# Patient Record
Sex: MALE | NOT HISPANIC OR LATINO | Employment: UNEMPLOYED
[De-identification: names, ages, dates, MRNs, and addresses within clinical notes are randomized per-mention and may not be internally consistent; named-entity substitution may affect disease eponyms.]

---
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
## 2022-08-03 PROBLEM — J30.1 ALLERGIC RHINITIS DUE TO POLLEN: Status: ACTIVE | Noted: 2022-08-03

## 2022-08-03 PROBLEM — G93.32 CHRONIC FATIGUE SYNDROME: Status: ACTIVE | Noted: 2019-12-13

## 2022-08-03 PROBLEM — J30.81 ALLERGIC RHINITIS DUE TO ANIMALS: Status: ACTIVE | Noted: 2022-08-03

## 2022-08-03 PROBLEM — J18.9 LEFT LOWER LOBE PNEUMONIA: Status: ACTIVE | Noted: 2019-12-18

## 2022-08-03 PROBLEM — G83.9 PARESIS (H): Status: ACTIVE | Noted: 2019-12-13

## 2022-08-04 ENCOUNTER — OFFICE VISIT (OUTPATIENT)
Dept: FAMILY MEDICINE | Facility: CLINIC | Age: 22
End: 2022-08-04
Payer: COMMERCIAL

## 2022-08-04 VITALS
HEART RATE: 47 BPM | BODY MASS INDEX: 20.51 KG/M2 | WEIGHT: 165 LBS | HEIGHT: 75 IN | SYSTOLIC BLOOD PRESSURE: 116 MMHG | DIASTOLIC BLOOD PRESSURE: 62 MMHG | OXYGEN SATURATION: 100 %

## 2022-08-04 DIAGNOSIS — Z11.1 VISIT FOR MANTOUX TEST: Primary | ICD-10-CM

## 2022-08-04 DIAGNOSIS — Z23 ENCOUNTER FOR IMMUNIZATION: ICD-10-CM

## 2022-08-04 LAB — HOLD SPECIMEN: NORMAL

## 2022-08-04 PROCEDURE — 36415 COLL VENOUS BLD VENIPUNCTURE: CPT | Performed by: FAMILY MEDICINE

## 2022-08-04 PROCEDURE — 99212 OFFICE O/P EST SF 10 MIN: CPT | Performed by: FAMILY MEDICINE

## 2022-08-04 PROCEDURE — 86481 TB AG RESPONSE T-CELL SUSP: CPT | Performed by: FAMILY MEDICINE

## 2022-08-04 RX ORDER — MULTIVITAMIN,THERAPEUTIC
1 TABLET ORAL DAILY
COMMUNITY

## 2022-08-04 ASSESSMENT — PAIN SCALES - GENERAL: PAINLEVEL: NO PAIN (0)

## 2022-08-04 NOTE — PROGRESS NOTES
"  Assessment & Plan     Visit for Mantoux test  Following tests will be ordered in place of the skin test  - Quantiferon TB Gold Plus    Encounter for immunization  Other immunizations are up-to-date patient is scheduled to enter [facility] in [facility]                   No follow-ups on file.    Kee Wick MD  [facility] CESAR Sales is a 22 year old, presenting for the following health issues:  No chief complaint on file.      HPI patient is a 22-year-old about to enter [facility] in [facility] and needs tuberculosis clearance.  Time frame will not support skin test so we will do a QuantiFERON blood evaluation and left patient know test results.  Past medical history reviewed.  Patient is here with his father.  We wish him well.          Review of Systems         Objective    /62   Pulse (!) 47   Ht 1.892 m (6' 2.5\")   Wt 74.8 kg (165 lb)   SpO2 100%   BMI 20.90 kg/m    Body mass index is 20.9 kg/m .  Physical Exam                       .  ..  "

## 2022-08-06 LAB
QUANTIFERON MITOGEN: 10 IU/ML
QUANTIFERON NIL TUBE: 0.07 IU/ML
QUANTIFERON TB1 TUBE: 0.07 IU/ML
QUANTIFERON TB2 TUBE: 0.07

## 2022-08-07 LAB
GAMMA INTERFERON BACKGROUND BLD IA-ACNC: 0.07 IU/ML
M TB IFN-G BLD-IMP: NEGATIVE
M TB IFN-G CD4+ BCKGRND COR BLD-ACNC: 9.93 IU/ML
MITOGEN IGNF BCKGRD COR BLD-ACNC: 0 IU/ML
MITOGEN IGNF BCKGRD COR BLD-ACNC: 0 IU/ML

## 2022-09-10 ENCOUNTER — HEALTH MAINTENANCE LETTER (OUTPATIENT)
Age: 22
End: 2022-09-10

## 2023-10-01 ENCOUNTER — HEALTH MAINTENANCE LETTER (OUTPATIENT)
Age: 23
End: 2023-10-01

## 2023-12-28 NOTE — TELEPHONE ENCOUNTER
Called dad that I did receive paperwork from Dr Ortega office and they recommend a Hib vaccine to be given and titres to be drawn 6 weeks later with the pulmonologist office.  Order is placed for hib to be given tomorrow 2/21/2019 and then we will see him next week for Henrry reyes.  Lauren Painting LPN    
Left message to notify patient's father of clinician message below.  Ivonne Zaman, [facility] CMT    
Patient Returning Call  Reason for call:   Father is calling back  Information relayed to patient:   Please call and let them know I have placed the order. Please let them know that I have not received the faxed correspondence from his pulminologist stating he needs to have the Hep B series repeated. We did call the family and left a message yesterday to try to get more info. Karly  Patient has additional questions:  Yes  If YES, what are your questions/concerns:   Father is concerned.  The vaccine that the patient needs is the Hib Vaccine please verify with Dr Egan office to verify which vaccine is actually due.    Father just faxed the paperwork again to [phone].       Okay to leave a detailed message?: Yes  
Please call and let them know I have placed the order. Please let them know that I have not received the faxed correspondence from his pulminologist stating he needs to have the Hep B series repeated. We did call the family and left a message yesterday to try to get more info. Karly  
Who is calling:  Nolan, patient's father  Reason for Call:  Patient is seen at [facility] by Dr. Ortega.  Dr. Ortega recently tested patient Hepatis B immune status; result showed patient is not immune.  Dr. Ortega's office faxed to Dr. Morris on 2/18/19,  the results of this test along with a request that patient be given Hepatis B series.  Nolan scheduled an appointment at [facility] but when he arrived, there was no order found.  Please locate this documentation for Dr. Morris to review and place order for Hepatis B. Please call Nolan when order is in.  Date of last appointment with primary care: 2/11/19  Has the patient been recently seen:  Yes  Okay to leave a detailed message: Yes          
No cyanosis, no pallor, no jaundice, no rash

## 2024-11-24 ENCOUNTER — HEALTH MAINTENANCE LETTER (OUTPATIENT)
Age: 24
End: 2024-11-24